# Patient Record
Sex: FEMALE | Race: OTHER | NOT HISPANIC OR LATINO | Employment: UNEMPLOYED | URBAN - METROPOLITAN AREA
[De-identification: names, ages, dates, MRNs, and addresses within clinical notes are randomized per-mention and may not be internally consistent; named-entity substitution may affect disease eponyms.]

---

## 2019-09-12 ENCOUNTER — TRANSCRIBE ORDERS (OUTPATIENT)
Dept: NEUROSURGERY | Facility: CLINIC | Age: 44
End: 2019-09-12

## 2019-09-12 DIAGNOSIS — G89.4 CHRONIC PAIN SYNDROME: Primary | ICD-10-CM

## 2019-09-23 ENCOUNTER — CONSULT (OUTPATIENT)
Dept: NEUROSURGERY | Facility: CLINIC | Age: 44
End: 2019-09-23
Payer: OTHER MISCELLANEOUS

## 2019-09-23 VITALS
HEART RATE: 101 BPM | DIASTOLIC BLOOD PRESSURE: 82 MMHG | HEIGHT: 63 IN | SYSTOLIC BLOOD PRESSURE: 164 MMHG | BODY MASS INDEX: 24.8 KG/M2 | RESPIRATION RATE: 16 BRPM | WEIGHT: 140 LBS

## 2019-09-23 DIAGNOSIS — G89.4 CHRONIC PAIN SYNDROME: Primary | ICD-10-CM

## 2019-09-23 DIAGNOSIS — M96.1 POSTLAMINECTOMY SYNDROME: ICD-10-CM

## 2019-09-23 PROCEDURE — 99214 OFFICE O/P EST MOD 30 MIN: CPT | Performed by: NEUROLOGICAL SURGERY

## 2019-09-23 RX ORDER — DIAZEPAM 10 MG/1
TABLET ORAL
COMMUNITY
Start: 2018-08-20

## 2019-09-23 RX ORDER — DIPHENOXYLATE HYDROCHLORIDE AND ATROPINE SULFATE 2.5; .025 MG/1; MG/1
TABLET ORAL
COMMUNITY
Start: 2019-07-16

## 2019-09-23 RX ORDER — FUROSEMIDE 20 MG/1
20 TABLET ORAL 2 TIMES DAILY
COMMUNITY
Start: 2016-01-18

## 2019-09-23 RX ORDER — ROPINIROLE 1 MG/1
1 TABLET, FILM COATED ORAL 2 TIMES DAILY
COMMUNITY
Start: 2016-02-15

## 2019-09-23 RX ORDER — DICYCLOMINE HCL 20 MG
TABLET ORAL
COMMUNITY
Start: 2019-04-01

## 2019-09-23 RX ORDER — METHOCARBAMOL 750 MG/1
TABLET, FILM COATED ORAL
COMMUNITY
Start: 2018-08-22

## 2019-09-23 RX ORDER — CETIRIZINE HYDROCHLORIDE 5 MG/1
5 TABLET ORAL DAILY
COMMUNITY
Start: 2019-07-26 | End: 2020-07-25

## 2019-09-23 RX ORDER — LEVOTHYROXINE SODIUM 0.07 MG/1
75 TABLET ORAL DAILY
COMMUNITY
Start: 2019-05-06

## 2019-09-23 RX ORDER — BUTALBITAL, ACETAMINOPHEN AND CAFFEINE 50; 325; 40 MG/1; MG/1; MG/1
TABLET ORAL
COMMUNITY
Start: 2018-08-20

## 2019-09-23 NOTE — PROGRESS NOTES
Assessment/Plan:    No problem-specific Assessment & Plan notes found for this encounter  Patient is unchanged  Symptoms, as detailed in HPI, continue to significantly impact of patient's quality of life in daily activities  After carefully considering presentation, investigations, functional status and co-morbidities, the risk/benefit profile of surgical intervention is not favorable  History, physical examination and diagnostic tests were reviewed and questions answered  Diagnosis, care plan and treatment options were discussed  The patient understand instructions and will follow up as directed  Patient has a end of life battery for her [employer] system  She is incoherent on visit  I have concerns she at this time does not understand the plan that I explained to her  She also needs more records and imaging before I make a decision on the next step  I will follow up after she has her CT thoracic completed  IMAGING REVIEWED: CT lumbar show post surgical changes of a multilevel lumbar fusion       Diagnoses and all orders for this visit:    Chronic pain syndrome  -     Ambulatory referral to Neurosurgery  -     UA w Reflex to Microscopic w Reflex to Culture  -     Comprehensive metabolic panel; Future  -     CBC and differential; Future  -     APTT; Future  -     Protime-INR; Future  -     hCG, quantitative; Future  -     HEMOGLOBIN A1C W/ EAG ESTIMATION; Future  -     CT thoracic spine without contrast; Future    Postlaminectomy syndrome    Other orders  -     butalbital-acetaminophen-caffeine (FIORICET,ESGIC) -40 mg per tablet; 1 tab 2 times daily for headaches  -     cetirizine (ZyrTEC) 5 MG tablet; Take 5 mg by mouth daily  -     diazepam (VALIUM) 10 mg tablet;  One half tab 2 times daily for anxiety as needed  -     dicyclomine (BENTYL) 20 mg tablet; TAKE 1 TABLET BY MOUTH 4 TIMES A DAY AS NEEDED FOR ABDOMINAL PAIN  -     diphenoxylate-atropine (LOMOTIL) 2 5-0 025 mg per tablet; 1 tab 4 times daily  -     furosemide (LASIX) 20 mg tablet; Take 20 mg by mouth 2 (two) times a day  -     levothyroxine 75 mcg tablet; Take 75 mcg by mouth daily  -     methocarbamol (ROBAXIN) 750 mg tablet; 1 tab 2 times daily  -     rOPINIRole (REQUIP) 1 mg tablet; Take 1 mg by mouth 2 (two) times a day  -     pentazocine-naloxone (TALWIN NX) 50-0 5 MG per tablet; Take 1 tablet by mouth every 6 (six) hours as needed          I have spent 25 minutes with Patient  today in which greater than 50% of this time was spent in counseling/coordination of care regarding Diagnostic results, Prognosis and Impressions  Subjective:      Patient ID: Tal Martinez is a 38 y o  female  Patient is a 38year old female with symptoms of low back and leg pain  Patient has a spinal cord stimulator with [employer] and reports efficacy from stimulation but recently the stimulation has worsened  She reports frequent charging  She has very little details of her surgeries and understanding of the images she has available  She requests new leads to support her old leads  The following portions of the patient's history were reviewed and updated as appropriate: She  has a past medical history of Cancer ([facility] [address] ) and History of transfusion  She There are no active problems to display for this patient  She  has a past surgical history that includes Lumbar fusion and Thoracic Fusion  Her family history is not on file  She  has no tobacco, alcohol, and drug history on file    Current Outpatient Medications   Medication Sig Dispense Refill    butalbital-acetaminophen-caffeine (FIORICET,ESGIC) -40 mg per tablet 1 tab 2 times daily for headaches      cetirizine (ZyrTEC) 5 MG tablet Take 5 mg by mouth daily      diazepam (VALIUM) 10 mg tablet One half tab 2 times daily for anxiety as needed      dicyclomine (BENTYL) 20 mg tablet TAKE 1 TABLET BY MOUTH 4 TIMES A DAY AS NEEDED FOR ABDOMINAL PAIN      diphenoxylate-atropine (LOMOTIL) 2 5-0 025 mg per tablet 1 tab 4 times daily      furosemide (LASIX) 20 mg tablet Take 20 mg by mouth 2 (two) times a day      levothyroxine 75 mcg tablet Take 75 mcg by mouth daily      methocarbamol (ROBAXIN) 750 mg tablet 1 tab 2 times daily      pentazocine-naloxone (TALWIN NX) 50-0 5 MG per tablet Take 1 tablet by mouth every 6 (six) hours as needed      rOPINIRole (REQUIP) 1 mg tablet Take 1 mg by mouth 2 (two) times a day       No current facility-administered medications for this visit  Current Outpatient Medications on File Prior to Visit   Medication Sig    butalbital-acetaminophen-caffeine (FIORICET,ESGIC) -40 mg per tablet 1 tab 2 times daily for headaches    cetirizine (ZyrTEC) 5 MG tablet Take 5 mg by mouth daily    diazepam (VALIUM) 10 mg tablet One half tab 2 times daily for anxiety as needed    dicyclomine (BENTYL) 20 mg tablet TAKE 1 TABLET BY MOUTH 4 TIMES A DAY AS NEEDED FOR ABDOMINAL PAIN    diphenoxylate-atropine (LOMOTIL) 2 5-0 025 mg per tablet 1 tab 4 times daily    furosemide (LASIX) 20 mg tablet Take 20 mg by mouth 2 (two) times a day    levothyroxine 75 mcg tablet Take 75 mcg by mouth daily    methocarbamol (ROBAXIN) 750 mg tablet 1 tab 2 times daily    pentazocine-naloxone (TALWIN NX) 50-0 5 MG per tablet Take 1 tablet by mouth every 6 (six) hours as needed    rOPINIRole (REQUIP) 1 mg tablet Take 1 mg by mouth 2 (two) times a day     No current facility-administered medications on file prior to visit  She is allergic to flurbiprofen; ketorolac tromethamine; and paroxetine       Review of Systems   Constitutional: Negative  HENT: Negative  Eyes: Negative  Respiratory: Negative  Cardiovascular: Negative  Gastrointestinal:        Bowel incontince   Endocrine: Negative      Genitourinary:        Incontinence   Musculoskeletal: Positive for back pain (SCS placed for LBP and bilateral leg pain   ), gait problem and joint swelling (bilateral leg)    Neurological: Positive for dizziness, weakness (bilateral leg), numbness (left leg, bilateral from knees down) and headaches (migraines)  Psychiatric/Behavioral: Positive for dysphoric mood  The patient is nervous/anxious  Objective:      /82 (BP Location: Left arm)   Pulse 101   Resp 16   Ht 5' 3" (1 6 m)   Wt 63 5 kg (140 lb)   BMI 24 80 kg/m²          Physical Exam   Constitutional: She appears well-developed  Disheveled appearing   HENT:   Head: Normocephalic  Eyes: Pupils are equal, round, and reactive to light  Conjunctivae and EOM are normal    Neck: Normal range of motion  No thyromegaly present  Cardiovascular: Normal rate  Pulmonary/Chest: Effort normal    Neurological: She is alert  She has normal strength  No cranial nerve deficit or sensory deficit  Skin: Skin is warm and dry  She is not diaphoretic     Psychiatric:   Behavior abnormal  Convoluted speech  Speech slurred

## 2019-10-01 ENCOUNTER — TELEPHONE (OUTPATIENT)
Dept: NEUROSURGERY | Facility: CLINIC | Age: 44
End: 2019-10-01

## 2019-10-01 NOTE — TELEPHONE ENCOUNTER
Message left by pt requesting a call back  Returned call with no answer, and message requesting a return call

## 2019-10-04 ENCOUNTER — TELEPHONE (OUTPATIENT)
Dept: NEUROSURGERY | Facility: CLINIC | Age: 44
End: 2019-10-04

## 2019-10-04 NOTE — TELEPHONE ENCOUNTER
10/4/19 PT CALLED AND LMOM FOR SOMEONE TO CALL HER BACK REGARDING GETTING A COPY OF HER CT [facility]INE [facility] TO MAKE AN APPT FOR IPG REPLACEMENT  PLEASE CALL PT [phone] TO DISCUSS

## 2019-10-04 NOTE — TELEPHONE ENCOUNTER
Patient called about scheduling appointment  I reviewed chart with her and advised we need CT uploaded and once that is complete and Dr Gilford Gash reviews records that were received - He can make a determination on next step       Patient will check on the disc, she said she had CT completed at [facility]

## 2019-10-04 NOTE — TELEPHONE ENCOUNTER
Received call from patient asking if we could request a copy of the disc since it's far for her to go to the hospital to obtain it herself  Called [facility] and found out she did get hte CT t-spine done at their [facility] location  Called them and requested the disc be sent to us via [email or web address] and provided them with our account number  Faxed release of health information which was signed by patient along with request for disc to [phone] which is what the lady from [facility] at [facility] informed me to do

## 2019-10-09 ENCOUNTER — TELEPHONE (OUTPATIENT)
Dept: NEUROSURGERY | Facility: CLINIC | Age: 44
End: 2019-10-09

## 2019-10-09 DIAGNOSIS — G89.4 CHRONIC PAIN SYNDROME: ICD-10-CM

## 2019-10-09 DIAGNOSIS — Z53.20: Primary | ICD-10-CM

## 2019-10-09 NOTE — TELEPHONE ENCOUNTER
Returned call left VM regarding cannot charge battery,problem positioning charging device  Returned gema left 2 VM requesting CB

## 2019-10-09 NOTE — TELEPHONE ENCOUNTER
Patient called back returning your call  I advised I would give MSG for a return call       Patient said that if she does not  calls if she does not know the phone #  I recommended to her to  phone to receive CB

## 2019-10-09 NOTE — TELEPHONE ENCOUNTER
Returned call to patient she reports [facility]CS IPG will not charge , BSCI  As per Dr Mitchael Goodell office vs note  9/23/2019 "Patient has a end of life battery for her [employer] system"  "I will follow up after she has her CT thoracic completed"      E-mail communication to Dr Eddy Victor patient completed CT of thoracic spine as you ordered during 9/23 visit ---images in PACS ----Please View  Should we schedule appointment for u to discuss surgical plan? Danielle Galvan ---please follow if Dr Lucas Victor would like appointment, then schedule  You can place on my schedule on a day Dr Lucas Victor is in office  Patient is very upset that no one has returned call regarding receiving Disc and scheduling appointment  Her [employer]SC[employer] IPG has reached EOL requiring replacement       Thank You

## 2019-10-18 NOTE — TELEPHONE ENCOUNTER
Finally reached patient  She understands a drug screen is ordered , she must go to [facility] before appointment complete  drug screen before arrival for appointment   DR Amol Archuleta will not see her without drug scree tet completed prior visit  DR Amol Archuleta reports patient was incoherent at last appointment not understanding his attempt to explain surgical procedure  for IPG replacement , she appeared to be under the influence  Dr Amol Archuleta and I discussed medication regimen consist of several sedating medications muscle relaxer,opioid, anxiolytic, and barbiturates  Explained why UDS is required before appointment , patient reports I am used to the medications I have been on them since I was 13  She denies inability to comprehend information during appointment and denies being under the ingfluence  She remarked my  can tell you I was ok  She agrees to complete drug screen before arriving for appointment and understands will not be seen otherwise  She is rambling with flight of ideas talking about pain pump, a different kind of spinal cord stimulator and a battery and what Njini told her about the procedure she would have performed   She is difficult to redirect

## 2020-06-26 DIAGNOSIS — G89.29 OTHER CHRONIC PAIN: ICD-10-CM

## 2020-06-26 DIAGNOSIS — M54.41 LUMBAGO WITH SCIATICA, RIGHT SIDE: ICD-10-CM

## 2020-06-26 DIAGNOSIS — M54.42 LUMBAGO WITH SCIATICA, LEFT SIDE: ICD-10-CM
